# Patient Record
Sex: FEMALE
[De-identification: names, ages, dates, MRNs, and addresses within clinical notes are randomized per-mention and may not be internally consistent; named-entity substitution may affect disease eponyms.]

---

## 2018-05-18 ENCOUNTER — RX RENEWAL (OUTPATIENT)
Age: 68
End: 2018-05-18

## 2018-05-18 RX ORDER — EPINEPHRINE 0.3 MG/.3ML
0.3 INJECTION INTRAMUSCULAR
Qty: 1 | Refills: 0 | Status: ACTIVE | COMMUNITY
Start: 2018-05-18 | End: 1900-01-01

## 2018-11-13 ENCOUNTER — APPOINTMENT (OUTPATIENT)
Dept: INTERNAL MEDICINE | Facility: CLINIC | Age: 68
End: 2018-11-13
Payer: MEDICARE

## 2018-11-13 VITALS
TEMPERATURE: 98.1 F | WEIGHT: 171 LBS | HEART RATE: 82 BPM | HEIGHT: 63 IN | BODY MASS INDEX: 30.3 KG/M2 | OXYGEN SATURATION: 92 %

## 2018-11-13 VITALS — DIASTOLIC BLOOD PRESSURE: 90 MMHG | SYSTOLIC BLOOD PRESSURE: 150 MMHG

## 2018-11-13 DIAGNOSIS — E78.5 HYPERLIPIDEMIA, UNSPECIFIED: ICD-10-CM

## 2018-11-13 DIAGNOSIS — Z87.891 PERSONAL HISTORY OF NICOTINE DEPENDENCE: ICD-10-CM

## 2018-11-13 DIAGNOSIS — Z87.09 PERSONAL HISTORY OF OTHER DISEASES OF THE RESPIRATORY SYSTEM: ICD-10-CM

## 2018-11-13 DIAGNOSIS — Z87.898 PERSONAL HISTORY OF OTHER SPECIFIED CONDITIONS: ICD-10-CM

## 2018-11-13 DIAGNOSIS — R55 SYNCOPE AND COLLAPSE: ICD-10-CM

## 2018-11-13 DIAGNOSIS — M54.5 LOW BACK PAIN: ICD-10-CM

## 2018-11-13 PROCEDURE — 99215 OFFICE O/P EST HI 40 MIN: CPT | Mod: 25

## 2018-11-13 PROCEDURE — 36415 COLL VENOUS BLD VENIPUNCTURE: CPT

## 2018-11-13 PROCEDURE — 93000 ELECTROCARDIOGRAM COMPLETE: CPT

## 2018-11-13 RX ORDER — PREDNISONE 5 MG/1
5 TABLET ORAL
Qty: 59 | Refills: 0 | Status: DISCONTINUED | COMMUNITY
Start: 2018-05-18 | End: 2018-11-13

## 2018-11-13 NOTE — ASSESSMENT
[FreeTextEntry1] : recent episode of unexplained syncope, with no sequelae, no real bruising. no further sx.\par h/o hip replacement \par h/o syncope at a younger age.\par h/o recent cardiac eval and hyperlipidemia

## 2018-11-13 NOTE — HISTORY OF PRESENT ILLNESS
[FreeTextEntry8] :  early Sat am, got up to go to the BR, felt warm, felt dizzy and was going to put on , and gelt warm, and fainted at home, and  said she was disoriented, has sl pain in buttock. \par fine all day sat, and since then. no evidence of seizure or post ictal episode.\par no h/o heart disease\par had an episode of syncope as younger person after eating a large meal

## 2018-11-13 NOTE — PHYSICAL EXAM
[Well Nourished] : well nourished [Well Developed] : well developed [Normal Sclera/Conjunctiva] : normal sclera/conjunctiva [Normal Outer Ear/Nose] : the outer ears and nose were normal in appearance [No JVD] : no jugular venous distention [Supple] : supple [No Lymphadenopathy] : no lymphadenopathy [No Respiratory Distress] : no respiratory distress  [Clear to Auscultation] : lungs were clear to auscultation bilaterally [No Accessory Muscle Use] : no accessory muscle use [Normal Rate] : normal rate  [Regular Rhythm] : with a regular rhythm [Normal S1, S2] : normal S1 and S2 [No Carotid Bruits] : no carotid bruits [No Abdominal Bruit] : a ~M bruit was not heard ~T in the abdomen [No Varicosities] : no varicosities [No Edema] : there was no peripheral edema [No Extremity Clubbing/Cyanosis] : no extremity clubbing/cyanosis [Declined Breast Exam] : declined breast exam  [Soft] : abdomen soft [Non Tender] : non-tender [No CVA Tenderness] : no CVA  tenderness [No Rash] : no rash [Normal Gait] : normal gait [Coordination Grossly Intact] : coordination grossly intact [No Focal Deficits] : no focal deficits [Deep Tendon Reflexes (DTR)] : deep tendon reflexes were 2+ and symmetric [de-identified] : anxious in nad [FreeTextEntry1] : def [de-identified] : not done [de-identified] : well healed hip replacement scar on right, no ecchymoses noted

## 2018-11-13 NOTE — HEALTH RISK ASSESSMENT
[One fall no injury in past year] : Patient reported one fall in the past year without injury [0] : 2) Feeling down, depressed, or hopeless: Not at all (0) [] : No [de-identified] : Socially [de-identified] : Patient fainted a few days ago without injury [[id number]] : 0

## 2018-11-13 NOTE — PLAN
[FreeTextEntry1] : throat cult to lab, per patient's request\par labs, incl. crp and vit D level. \par ekg today is normal\par at this point, if all of above is normal , I do not think she needs further neuro or cardiology eval. if recurs, consider further w/u

## 2018-11-13 NOTE — REVIEW OF SYSTEMS
[Negative] : Heme/Lymph [FreeTextEntry4] : recently had a uri and treated with amoxicillin and now has a sore throat [FreeTextEntry5] : saw a cardiology and had a strep test [FreeTextEntry7] : had a colonoscopy [FreeTextEntry9] : joints hurt

## 2018-11-14 LAB
ALBUMIN SERPL ELPH-MCNC: 4.9 G/DL
ALP BLD-CCNC: 76 U/L
ALT SERPL-CCNC: 19 U/L
ANION GAP SERPL CALC-SCNC: 17 MMOL/L
APPEARANCE: CLEAR
AST SERPL-CCNC: 21 U/L
BASOPHILS # BLD AUTO: 0.03 K/UL
BASOPHILS NFR BLD AUTO: 0.5 %
BILIRUB SERPL-MCNC: 0.4 MG/DL
BILIRUBIN URINE: NEGATIVE
BLOOD URINE: NEGATIVE
BUN SERPL-MCNC: 17 MG/DL
CALCIUM SERPL-MCNC: 10 MG/DL
CHLORIDE SERPL-SCNC: 100 MMOL/L
CHOLEST SERPL-MCNC: 191 MG/DL
CHOLEST/HDLC SERPL: 3.4 RATIO
CO2 SERPL-SCNC: 24 MMOL/L
COLOR: YELLOW
CREAT SERPL-MCNC: 0.82 MG/DL
CRP SERPL-MCNC: 0.11 MG/DL
EOSINOPHIL # BLD AUTO: 0.25 K/UL
EOSINOPHIL NFR BLD AUTO: 3.9 %
GLUCOSE QUALITATIVE U: NEGATIVE MG/DL
GLUCOSE SERPL-MCNC: 102 MG/DL
HBA1C MFR BLD HPLC: 6 %
HCT VFR BLD CALC: 43.7 %
HDLC SERPL-MCNC: 56 MG/DL
HGB BLD-MCNC: 13.9 G/DL
IMM GRANULOCYTES NFR BLD AUTO: 0.2 %
KETONES URINE: NEGATIVE
LDLC SERPL CALC-MCNC: 86 MG/DL
LEUKOCYTE ESTERASE URINE: NEGATIVE
LYMPHOCYTES # BLD AUTO: 1.85 K/UL
LYMPHOCYTES NFR BLD AUTO: 28.6 %
MAN DIFF?: NORMAL
MCHC RBC-ENTMCNC: 29.3 PG
MCHC RBC-ENTMCNC: 31.8 GM/DL
MCV RBC AUTO: 92.2 FL
MONOCYTES # BLD AUTO: 0.45 K/UL
MONOCYTES NFR BLD AUTO: 7 %
NEUTROPHILS # BLD AUTO: 3.87 K/UL
NEUTROPHILS NFR BLD AUTO: 59.8 %
NITRITE URINE: NEGATIVE
PH URINE: 6.5
PLATELET # BLD AUTO: 274 K/UL
POTASSIUM SERPL-SCNC: 4.5 MMOL/L
PROT SERPL-MCNC: 7.2 G/DL
PROTEIN URINE: NEGATIVE MG/DL
RBC # BLD: 4.74 M/UL
RBC # FLD: 13.6 %
SODIUM SERPL-SCNC: 141 MMOL/L
SPECIFIC GRAVITY URINE: 1.01
TRIGL SERPL-MCNC: 244 MG/DL
TSH SERPL-ACNC: 3.87 UIU/ML
UROBILINOGEN URINE: NEGATIVE MG/DL
WBC # FLD AUTO: 6.46 K/UL

## 2018-11-15 LAB — 25(OH)D3 SERPL-MCNC: 31.9 NG/ML

## 2018-11-16 LAB — B-HEM STREP SPEC QL CULT: NORMAL

## 2020-09-25 ENCOUNTER — APPOINTMENT (OUTPATIENT)
Dept: INTERNAL MEDICINE | Facility: CLINIC | Age: 70
End: 2020-09-25

## 2020-12-16 PROBLEM — Z87.09 HISTORY OF SORE THROAT: Status: RESOLVED | Noted: 2018-11-13 | Resolved: 2020-12-16

## 2021-03-08 ENCOUNTER — APPOINTMENT (OUTPATIENT)
Dept: INTERNAL MEDICINE | Facility: CLINIC | Age: 71
End: 2021-03-08

## 2021-04-13 ENCOUNTER — APPOINTMENT (OUTPATIENT)
Dept: GASTROENTEROLOGY | Facility: AMBULATORY MEDICAL SERVICES | Age: 71
End: 2021-04-13
Payer: MEDICARE

## 2021-04-13 PROCEDURE — 45378 DIAGNOSTIC COLONOSCOPY: CPT

## 2024-03-19 ENCOUNTER — APPOINTMENT (OUTPATIENT)
Dept: ORTHOPEDIC SURGERY | Facility: CLINIC | Age: 74
End: 2024-03-19
Payer: MEDICARE

## 2024-03-19 VITALS — WEIGHT: 170 LBS | HEIGHT: 63 IN | BODY MASS INDEX: 30.12 KG/M2

## 2024-03-19 PROCEDURE — 99203 OFFICE O/P NEW LOW 30 MIN: CPT | Mod: 25

## 2024-03-19 PROCEDURE — J3490M: CUSTOM | Mod: NC

## 2024-03-19 PROCEDURE — 73130 X-RAY EXAM OF HAND: CPT | Mod: 50

## 2024-03-19 PROCEDURE — 20550 NJX 1 TENDON SHEATH/LIGAMENT: CPT | Mod: RT

## 2024-03-19 NOTE — HISTORY OF PRESENT ILLNESS
[de-identified] : R hand OA Stiffness and numbness MF is more painful and stiff   [7] : 7 [0] : 0 [Dull/Aching] : dull/aching [Ice] : ice [Tingling] : tingling [] : yes [FreeTextEntry1] : hands [FreeTextEntry5] : R hand is worse R MF locks has n/t wears braces at night [de-identified] : activity

## 2024-03-19 NOTE — ASSESSMENT
[FreeTextEntry1] : EMG  Return after EMG  R MF Trigger finger tendon sheath injection was performed because of pain inflammation and stiffness Anesthesia: ethyl chloride sprayed topically Celestone 6mg: An injection of Celestone 1cc Lidocaine: An injection of Lidocaine 1% 1cc Marcaine: An injection of Marcaine 0.5% 1cc  Patient has tried OTC's including aspirin, Ibuprofen, Aleve etc or prescription NSAIDS, and/or exercises at home and/ or physical therapy without satisfactory response. After verbal consent using sterile preparation and technique. The risks, benefits, and alternatives to cortisone injection were explained in full to the patient. Risks outlined include but are not limited to infection, sepsis, bleeding, scarring, skin discoloration, temporary increase in pain, syncopal episode, failure to resolve symptoms, allergic reaction, symptom recurrence, and elevation of blood sugar in diabetics. Patient understood the risks. All questions were answered. After discussion of options, patient requested an injection. Oral informed consent was obtained and sterile prep was done of the injection site. Sterile technique was utilized for the procedure including the preparation of the solutions used for the injection. Patient tolerated the procedure well. Advised to ice the injection site this evening. Prep with betadine locally to site. Sterile technique used

## 2024-03-27 ENCOUNTER — APPOINTMENT (OUTPATIENT)
Dept: NEUROLOGY | Facility: CLINIC | Age: 74
End: 2024-03-27
Payer: MEDICARE

## 2024-03-27 PROCEDURE — 95886 MUSC TEST DONE W/N TEST COMP: CPT

## 2024-03-27 PROCEDURE — 95912 NRV CNDJ TEST 11-12 STUDIES: CPT

## 2024-04-02 ENCOUNTER — APPOINTMENT (OUTPATIENT)
Dept: ORTHOPEDIC SURGERY | Facility: CLINIC | Age: 74
End: 2024-04-02
Payer: MEDICARE

## 2024-04-02 VITALS — HEIGHT: 63 IN | WEIGHT: 170 LBS | BODY MASS INDEX: 30.12 KG/M2

## 2024-04-02 DIAGNOSIS — M65.331 TRIGGER FINGER, RIGHT MIDDLE FINGER: ICD-10-CM

## 2024-04-02 DIAGNOSIS — G56.02 CARPAL TUNNEL SYNDROME, LEFT UPPER LIMB: ICD-10-CM

## 2024-04-02 DIAGNOSIS — G56.01 CARPAL TUNNEL SYNDROME, RIGHT UPPER LIMB: ICD-10-CM

## 2024-04-02 PROCEDURE — 99213 OFFICE O/P EST LOW 20 MIN: CPT

## 2024-04-02 NOTE — ASSESSMENT
[FreeTextEntry1] : I disucssed brace at night THerapy- ART I disucssed injection and surgery   Return prn

## 2024-04-02 NOTE — PHYSICAL EXAM
[de-identified] : R hand:  Tender volar wrist  Good finger ROM  +Tinels  +Phalens  +Compression test  Decreased sensation median nerve distribution   L hand:  Tender volar wrist  Good finger ROM  +Tinels  +Phalens  +Compression test  Decreased sensation median nerve distribution

## 2024-04-02 NOTE — HISTORY OF PRESENT ILLNESS
[4] : 4 [Dull/Aching] : dull/aching [de-identified] : R MF trigger injection helped last visit  EMG shows L mod CTS; R mild/mod CTS   [FreeTextEntry1] : R wrist,mf

## 2024-06-11 ENCOUNTER — APPOINTMENT (OUTPATIENT)
Dept: ORTHOPEDIC SURGERY | Facility: CLINIC | Age: 74
End: 2024-06-11

## 2024-06-11 ENCOUNTER — APPOINTMENT (OUTPATIENT)
Dept: ORTHOPEDIC SURGERY | Facility: CLINIC | Age: 74
End: 2024-06-11
Payer: MEDICARE

## 2024-06-11 VITALS — HEIGHT: 63 IN | BODY MASS INDEX: 30.12 KG/M2 | WEIGHT: 170 LBS

## 2024-06-11 DIAGNOSIS — M25.552 PAIN IN LEFT HIP: ICD-10-CM

## 2024-06-11 PROCEDURE — 99214 OFFICE O/P EST MOD 30 MIN: CPT

## 2024-06-11 PROCEDURE — 73503 X-RAY EXAM HIP UNI 4/> VIEWS: CPT | Mod: LT

## 2024-06-11 NOTE — DISCUSSION/SUMMARY
[de-identified] : The patient was advised of the diagnosis.  The natural history of the pathology was explained in full to the patient in layman's terms. All questions were answered.  The risks and benefits of surgical and non-surgical treatment alternatives were explained in full to the patient.  We had an extensive discussion regarding surgical intervention including risk, benefits and alternatives.  The risks include, but are not limited to infection, bleeding, injury to small nerves and blood vessels, pain, stiffness, phlebitis, DVT malunion, nonunion, and need for secondary procedures.  Preoperative, intraoperative and postoperative care were discussed and outlined to the patient as well.  The patient has had an opportunity to ask any question and all were answered to the best of my ability.  The natural progression of Osteoarthritis was explained to the patient.  We discussed the possible treatment options from conservative to operative.  These included NSAIDS, Glucosamine and Chondrotin sulfate, and Physical Therapy as well different types of injections.  We also discussed that at some point they may progress to needed a NORMA.  Information and pamphlets were given when approprate.  Entered by Stefania Barlow acting as a scribe.
No

## 2024-06-11 NOTE — ASSESSMENT
[FreeTextEntry1] : 6/11/24: Pt with adv LT hip OA. Discussed conservative vs surgical tx options- risks and benefits explained. Had extensive discussion regarding posterior vs anterior approach and the reasons why I choose to do posterior approach. Discused all surgical details and what postop recovery would entail. She is well informed and states she is interested in proceeding with L NORMA but she may want to discuss with some anterior approach surgeons first- she will call and let us know how she would like to proceed.

## 2024-06-11 NOTE — HISTORY OF PRESENT ILLNESS
[7] : 7 [Throbbing] : throbbing [Tightness] : tightness [Intermittent] : intermittent [Leisure] : leisure [Nothing helps with pain getting better] : Nothing helps with pain getting better [de-identified] : 6/11/24: 74F with LT hip pain x 2023. No specific injury/trauma. Hx of R NORMA in 2017 by Dr. Correa- doing well. Most pain laterally. Pain worse with walking more than a couple of blocks. Occ gets a stabbing pain with bending down. States she is able to bike with no pain. Frequently stretches. Not taking meds for pain. No formal tx so far. Ambulates without assistance.  [] : no [FreeTextEntry1] : left hip  [FreeTextEntry5] : Chronic left hip pain since 2023. No injury. Having lateral pain.  h/o of rt leidy 2017

## 2024-06-11 NOTE — IMAGING
[de-identified] : LEFT HIP +2 pt pulses flexion to 90 IR to 10 ER to 15 pain in groin with ER positive impingement 5/5 strength but pain with resisted SLR no troch tenderness no buttocks tenderness  RIGHT HIP good ROM with no pain  XR LT HIP showing adv LT hip OA, sig loss of joint space, sclerotic bone and sig bone spurring

## 2024-07-02 ENCOUNTER — APPOINTMENT (OUTPATIENT)
Dept: ORTHOPEDIC SURGERY | Facility: CLINIC | Age: 74
End: 2024-07-02
Payer: MEDICARE

## 2024-07-02 VITALS — WEIGHT: 170 LBS | HEIGHT: 63 IN | BODY MASS INDEX: 30.12 KG/M2

## 2024-07-02 DIAGNOSIS — M16.12 UNILATERAL PRIMARY OSTEOARTHRITIS, LEFT HIP: ICD-10-CM

## 2024-07-02 PROCEDURE — 99204 OFFICE O/P NEW MOD 45 MIN: CPT

## 2025-02-24 ENCOUNTER — NON-APPOINTMENT (OUTPATIENT)
Age: 75
End: 2025-02-24

## 2025-03-13 ENCOUNTER — APPOINTMENT (OUTPATIENT)
Dept: ORTHOPEDIC SURGERY | Facility: CLINIC | Age: 75
End: 2025-03-13
Payer: MEDICARE

## 2025-03-13 VITALS — WEIGHT: 170 LBS | HEIGHT: 63.5 IN | BODY MASS INDEX: 29.75 KG/M2

## 2025-03-13 DIAGNOSIS — M16.12 UNILATERAL PRIMARY OSTEOARTHRITIS, LEFT HIP: ICD-10-CM

## 2025-03-13 PROCEDURE — 99215 OFFICE O/P EST HI 40 MIN: CPT

## 2025-03-13 PROCEDURE — G2211 COMPLEX E/M VISIT ADD ON: CPT

## 2025-03-13 PROCEDURE — 73502 X-RAY EXAM HIP UNI 2-3 VIEWS: CPT

## 2025-03-19 ENCOUNTER — NON-APPOINTMENT (OUTPATIENT)
Age: 75
End: 2025-03-19

## 2025-03-21 ENCOUNTER — TRANSCRIPTION ENCOUNTER (OUTPATIENT)
Age: 75
End: 2025-03-21

## 2025-03-21 ENCOUNTER — NON-APPOINTMENT (OUTPATIENT)
Age: 75
End: 2025-03-21

## 2025-03-21 ENCOUNTER — APPOINTMENT (OUTPATIENT)
Dept: ORTHOPEDIC SURGERY | Facility: CLINIC | Age: 75
End: 2025-03-21
Payer: MEDICARE

## 2025-03-21 DIAGNOSIS — G56.02 CARPAL TUNNEL SYNDROME, LEFT UPPER LIMB: ICD-10-CM

## 2025-03-21 DIAGNOSIS — M65.341 TRIGGER FINGER, RIGHT RING FINGER: ICD-10-CM

## 2025-03-21 DIAGNOSIS — M19.042 PRIMARY OSTEOARTHRITIS, LEFT HAND: ICD-10-CM

## 2025-03-21 DIAGNOSIS — G56.01 CARPAL TUNNEL SYNDROME, RIGHT UPPER LIMB: ICD-10-CM

## 2025-03-21 DIAGNOSIS — D21.12 BENIGN NEOPLASM OF CONNECTIVE AND OTHER SOFT TISSUE OF LEFT UPPER LIMB, INCLUDING SHOULDER: ICD-10-CM

## 2025-03-21 DIAGNOSIS — M19.041 PRIMARY OSTEOARTHRITIS, RIGHT HAND: ICD-10-CM

## 2025-03-21 DIAGNOSIS — M67.442 GANGLION, LEFT HAND: ICD-10-CM

## 2025-03-21 PROCEDURE — 99214 OFFICE O/P EST MOD 30 MIN: CPT

## 2025-03-21 PROCEDURE — 73130 X-RAY EXAM OF HAND: CPT | Mod: 50

## 2025-03-21 PROCEDURE — 73110 X-RAY EXAM OF WRIST: CPT | Mod: 50

## 2025-03-22 ENCOUNTER — NON-APPOINTMENT (OUTPATIENT)
Age: 75
End: 2025-03-22

## 2025-03-24 NOTE — PRE PROCEDURE NOTE - GENERAL PROCEDURE NAME
endoscopic left carpal tunnel release , possible open excision of left finger mass deep, exciuson of left index finger ganglion cyst

## 2025-03-24 NOTE — PRE PROCEDURE NOTE - PRE PROCEDURE EVALUATION
Reason For Visit       Hand & Wrist - Reason for Visit: DMITRI HAJI is being seen for an initial visit for. bilateral hands.  ?  History of Present Illness       HPI:    DMITRI HAJI is a 74 year old right hand dominant female. She comes in today for evaluation of bilateral hand pain and numbness/tingling. She states that her left hand is worse compared to her right. She rates her pain as an 8/10 and has a burning sensation in the morning. She also reports a right ring finger triggering.  ?  She saw Dr. Ezio Cote approximately 1 year ago and was given a cortisone injection for a right middle finger trigger finger.  ?  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 This is consistent with the clinical diagnosis of moderate left and moderate right carpal tunnel syndrome.  ?  She states that she is scheduled for left hip replacement on 5/12/2025 by Dr. Man.  ?  She is accompanied by her .  ?  Active Problems   1. 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 Carpal tunnel syndrome of left wrist (354.0) (G56.02)   3. Carpal tunnel syndrome of right wrist (354.0) (G56.01)   4. Ganglion cyst of finger of left hand (727.43) (M67.442)   5. History of angioedema (V13.89) (Z87.898)   6. Hyperlipidemia (272.4) (E78.5)   7. Lateral pain of left hip (719.45) (M25.552)   8. Lumbago (724.2) (M54.50)   9. Primary localized osteoarthrosis of pelvic region, right (715.15) (M16.11)   10. Primary osteoarthritis of left hip (715.15) (M16.12)   11. Syncope and collapse (780.2) (R55)   12. Trigger middle finger of right hand (727.03) (M65.331)           ?  Past Medical History   1. History of High cholesterol (272.0) (E78.00)   2. History of angioedema (V13.89) (Z87.898)   3. History of sore throat (V12.69) (Z87.09)   4. Personal history of arthritis (V13.4) (Z87.39)           Current Meds   1. Co Q 10 CAPS;   Therapy: (Recorded:40Wux6832) to Recorded   2. EpiPen 2-Aly 0.3 MG/0.3ML Injection Solution Auto-injector (EPINEPHrine); INJECT   0.3ML INTRAMUSCULARLY AS DIRECTED PRN;   Therapy: [id number] to (Evaluate:17Jun2018)  Requested for: 99Uml5955; Last   Rx:65Spq1206 Ordered   3. Fish Oil CAPS;   Therapy: (Recorded:85Etd7760) to Recorded   4. Vitamin D TABS;   Therapy: (Recorded:25Xpr6785) to Recorded           ?  Allergies   1. No Known Drug Allergies   2. Seasonal Allergies           ?  Review of Systems       Complete ROS:    Hand Dominance: right.    Neurological:. see HPI.    Constitutional, Eyes, ENT, Cardiovascular, Respiratory, Gastrointestinal, Genitourinary, Integumentary, Psychiatric, Endocrine, Heme/Lymph and Allergic/Immunologic review of systems are otherwise negative except as noted in HPI.  ?  Physical Exam       - Constitutional: This is a female in no obvious distress. She is accompanied by her .  - Psych: Patient is alert and oriented to person, place and time. Patient has a normal mood and affect.  - Cardiovascular: Normal pulses throughout the upper extremities. No significant varicosities are noted in the upper extremities.  - Neuro: Strength and sensation are intact throughout the upper extremities. Patient has normal coordination.  - Respiratory: Patient exhibits no evidence of shortness of breath or difficulty breathing.  - Skin: No rashes, lesions, or other abnormalities are noted in the upper extremities.  ---    Imaging: PA, lateral, and oblique radiographs of the bilateral wrists and hands demonstrate advanced bilateral MCP joint arthritis of the thumbs. Mild to moderate arthritis at the PIP and DIP joints of the digit. Moderate left and mild right thumb CMC joint arthritis.     ?  Pre-Surgical Assessment          Recommended PST type based on risk: PST Chart Review (low patient risk, very low procedural risk)       Surgery or procedure potential timeframe: 1 week    Anesthesia History: Previous Reactions to Anesthesia: none    STOP BANG Assessment: over 50 years of age, but does not snore loudly, not tired, fatigued, or sleepy during the daytime, not observed to have stopped during sleep, no hypertension, not treated for high blood pressure, BMI less than 35 kG/m2, neck circumference less than 16 inches and not male (birth sex)    STOP BANG Score: 0 - 2: Low Risk    Transfusion History - Blood Acceptance/Avoidance/Restrictions: Not willing to accept blood; Reason:    Previous blood transfusion: no    External/internal devices being used in treatment of patient: No devices in use in treatment    Device details: THR RIGHT    Reproductive, Female - Is patient pregnant? No, patient is not pregnant    Advance Directive/Health Care Proxy: No advance directive in chart and No Health Care Proxy information in chart  ?  Discussion/Summary       Narrative: She has findings consistent with ___  ?  I had a discussion with the patient regarding today's visit, the prognosis of this diagnosis, and treatment recommendations and options. At this time, with regard to her left carpal tunnel syndrome, I discussed treatment options of a cortisone injection or surgical management. She opted to proceed with surgical management and will speak to my surgical scheduler in regard to booking it.  ?  - The nature and purposes of endoscopic carpal tunnel release was discussed in detail with the patient. We discussed the surgical procedure in detail, as well as expected postoperative recovery and outcome.  - Possible risks, benefits and complications (from known and unknown causes) of the procedure were discussed in detail.  - Possible non-operative alternatives to the proposed treatment were discussed in detail.  - I discussed with the patient that I will be performing an endoscopic carpal tunnel release. We discussed both open and endoscopic carpal tunnel release, and the associated risks and benefits of each of these procedures. The patient understands that it is possible that the endoscopic carpal tunnel release may need to be converted to an open release during the procedure, if the carpal tunnel cannot be well visualized or safely released endoscopically. In addition, the patient does understand that, based upon some of the hand surgery literature, there is a slightly higher risk of iatrogenic nerve or vessel injury associated with endoscopic carpal tunnel release, as compared to open carpal tunnel release.  - She was also told that other possible risks/complications include, but are not limited to: Infection, nerve or vessel injury, stiffness, painful scar, poor outcome, need for additional surgical procedures, and other unforeseen complications.  - In addition, the possibility of an "unsuccessful outcome," despite "successful surgery," was discussed with the patient. The patient understands that nerve recovery after surgical release can be unpredictable, and there are no "guarantees" that the preoperative symptoms will completely resolve.  - The patient fully understands these risks and wishes to proceed.  - I had a lengthy discussion with the patient regarding today's visit, the diagnosis, and my surgical treatment recommendations. The patient has agreed to this plan of management and has expressed full understanding. All questions were fully answered to the patient's satisfaction.  ?  My cumulative time spent on this visit included: Preparation for the visit, review of the medical records, review of pertinent diagnostic studies, examination and counseling of the patient on the above diagnosis, treatment plan and prognosis, orders of diagnostic tests, medication and/or appropriate procedures and documentation in the medical records of today's visit.     ?  Assessment   1. Ganglion cyst of finger of left hand (727.43) (M67.442)   2. 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 Surgery Booking Form Inpatient  .  Status: Need Information - Required information   Requested for: 21Mar2025  Did the patient receive COVID 19 vaccine? : Yes, Patient stated  ERP (Enhanced Recovery Program) : No  Medical Evaluation : No  PST Triage Evaluation : PST Review  Admission Type : OP  Anesthesia Alert : N  Anesthesia Type : IVAS  Laterality : Left  Length of Procedure : 1 hour  Procedure Description: : 1. Endoscopic left carpal tunnel lease, possible open      2. Excision of left index finger mass, deep (51314)      3. Excision of left index finger ganglion cyst (61348)   2. Xray Hand 3 Views, Bilateral; Status:Complete - Retrospective By Protocol Authorization;    Done: 21Mar2025   3. Xray Wrist 3 Views, Bilateral; Status:Complete - Retrospective By Protocol Authorization;    Done: 21Mar2025           ?  End of Visit       Attestation:     This note was written by Josh Baron on 03/21/2025 acting solely as a scribe for Dr. Duane Barraza.  ?  All medical record entries made by the Scribe were at my, Dr. Duane Barraza, direction and personally dictated by me on 03/21/2025. I have personally reviewed the chart and agree that the record accurately reflects my personal performance of the history, physical exam, assessment and plan.  Attesting Faculty: See Attending Electronic Signature Below  ?  Addendum       Narrative: I, Josh Baron, acted solely as a scribe for Dr. Barraza on this date on 03/21/2025.  ?  Signatures       Electronically signed by : PHYLICIA MONTOYA NP; Mar 21 2025 12:15PM Eastern Standard Time (Author)    ?

## 2025-03-27 ENCOUNTER — TRANSCRIPTION ENCOUNTER (OUTPATIENT)
Age: 75
End: 2025-03-27

## 2025-03-27 ENCOUNTER — RESULT REVIEW (OUTPATIENT)
Age: 75
End: 2025-03-27

## 2025-03-27 ENCOUNTER — OUTPATIENT (OUTPATIENT)
Dept: OUTPATIENT SERVICES | Facility: HOSPITAL | Age: 75
LOS: 1 days | End: 2025-03-27
Payer: MEDICARE

## 2025-03-27 ENCOUNTER — APPOINTMENT (OUTPATIENT)
Dept: ORTHOPEDIC SURGERY | Facility: HOSPITAL | Age: 75
End: 2025-03-27

## 2025-03-27 VITALS
SYSTOLIC BLOOD PRESSURE: 115 MMHG | DIASTOLIC BLOOD PRESSURE: 62 MMHG | HEART RATE: 66 BPM | OXYGEN SATURATION: 100 % | RESPIRATION RATE: 14 BRPM

## 2025-03-27 VITALS
OXYGEN SATURATION: 100 % | HEART RATE: 65 BPM | TEMPERATURE: 98 F | WEIGHT: 168.43 LBS | RESPIRATION RATE: 16 BRPM | HEIGHT: 64 IN | SYSTOLIC BLOOD PRESSURE: 162 MMHG | DIASTOLIC BLOOD PRESSURE: 83 MMHG

## 2025-03-27 DIAGNOSIS — D21.12 BENIGN NEOPLASM OF CONNECTIVE AND OTHER SOFT TISSUE OF LEFT UPPER LIMB, INCLUDING SHOULDER: ICD-10-CM

## 2025-03-27 DIAGNOSIS — G56.02 CARPAL TUNNEL SYNDROME, LEFT UPPER LIMB: ICD-10-CM

## 2025-03-27 DIAGNOSIS — M67.442 GANGLION, LEFT HAND: ICD-10-CM

## 2025-03-27 PROCEDURE — 88307 TISSUE EXAM BY PATHOLOGIST: CPT | Mod: 26

## 2025-03-27 PROCEDURE — 88305 TISSUE EXAM BY PATHOLOGIST: CPT | Mod: 26

## 2025-03-27 PROCEDURE — 26116 EXC HAND TUM DEEP < 1.5 CM: CPT | Mod: F1

## 2025-03-27 PROCEDURE — 88307 TISSUE EXAM BY PATHOLOGIST: CPT

## 2025-03-27 PROCEDURE — 29848 WRIST ENDOSCOPY/SURGERY: CPT | Mod: LT

## 2025-03-27 PROCEDURE — 88305 TISSUE EXAM BY PATHOLOGIST: CPT

## 2025-03-27 PROCEDURE — 26160 REMOVE TENDON SHEATH LESION: CPT | Mod: F2

## 2025-03-27 PROCEDURE — 26160 REMOVE TENDON SHEATH LESION: CPT | Mod: 59,F1

## 2025-03-27 PROCEDURE — 26116 EXC HAND TUM DEEP < 1.5 CM: CPT | Mod: XU,F2

## 2025-03-27 RX ORDER — ACETAMINOPHEN 500 MG/5ML
1000 LIQUID (ML) ORAL ONCE
Refills: 0 | Status: COMPLETED | OUTPATIENT
Start: 2025-03-27 | End: 2025-03-27

## 2025-03-27 RX ORDER — IBUPROFEN 200 MG
1 TABLET ORAL
Qty: 15 | Refills: 0
Start: 2025-03-27

## 2025-03-27 RX ORDER — HYDROMORPHONE/SOD CHLOR,ISO/PF 2 MG/10 ML
1 SYRINGE (ML) INJECTION
Refills: 0 | Status: DISCONTINUED | OUTPATIENT
Start: 2025-03-27 | End: 2025-03-27

## 2025-03-27 RX ORDER — HYDROMORPHONE/SOD CHLOR,ISO/PF 2 MG/10 ML
0.5 SYRINGE (ML) INJECTION
Refills: 0 | Status: DISCONTINUED | OUTPATIENT
Start: 2025-03-27 | End: 2025-03-27

## 2025-03-27 RX ORDER — SODIUM CHLORIDE 9 G/1000ML
1000 INJECTION, SOLUTION INTRAVENOUS
Refills: 0 | Status: DISCONTINUED | OUTPATIENT
Start: 2025-03-27 | End: 2025-03-27

## 2025-03-27 RX ORDER — OXYCODONE HYDROCHLORIDE 30 MG/1
5 TABLET ORAL ONCE
Refills: 0 | Status: DISCONTINUED | OUTPATIENT
Start: 2025-03-27 | End: 2025-03-27

## 2025-03-27 RX ORDER — ONDANSETRON HCL/PF 4 MG/2 ML
4 VIAL (ML) INJECTION ONCE
Refills: 0 | Status: DISCONTINUED | OUTPATIENT
Start: 2025-03-27 | End: 2025-03-27

## 2025-03-27 RX ORDER — APREPITANT 40 MG/1
40 CAPSULE ORAL ONCE
Refills: 0 | Status: COMPLETED | OUTPATIENT
Start: 2025-03-27 | End: 2025-03-27

## 2025-03-27 RX ORDER — OXYCODONE HYDROCHLORIDE AND ACETAMINOPHEN 10; 325 MG/1; MG/1
1 TABLET ORAL
Qty: 5 | Refills: 0
Start: 2025-03-27

## 2025-03-27 RX ORDER — CEFAZOLIN SODIUM IN 0.9 % NACL 3 G/100 ML
2000 INTRAVENOUS SOLUTION, PIGGYBACK (ML) INTRAVENOUS ONCE
Refills: 0 | Status: COMPLETED | OUTPATIENT
Start: 2025-03-27 | End: 2025-03-27

## 2025-03-27 RX ADMIN — Medication 1 APPLICATION(S): at 07:14

## 2025-03-27 RX ADMIN — APREPITANT 40 MILLIGRAM(S): 40 CAPSULE ORAL at 07:14

## 2025-03-27 NOTE — BRIEF OPERATIVE NOTE - NSICDXBRIEFPREOP_GEN_ALL_CORE_FT
PRE-OP DIAGNOSIS:  Left carpal tunnel syndrome 27-Mar-2025 10:02:37  Marge Hinojosa  Lesion of finger 27-Mar-2025 10:03:13  Marge Hinojosa

## 2025-03-27 NOTE — ASU PATIENT PROFILE, ADULT - FALL HARM RISK - UNIVERSAL INTERVENTIONS
Bed in lowest position, wheels locked, appropriate side rails in place/Call bell, personal items and telephone in reach/Instruct patient to call for assistance before getting out of bed or chair/Non-slip footwear when patient is out of bed/[place] to call system/Physically safe environment - no spills, clutter or unnecessary equipment/Purposeful Proactive Rounding/Room/bathroom lighting operational, light cord in reach

## 2025-03-27 NOTE — ASU DISCHARGE PLAN (ADULT/PEDIATRIC) - NS MD DC FALL RISK RISK
For information on Fall & Injury Prevention, visit: [email or web address] OR  [email or web address] OR  https://www.cdc.gov/steadi/patient.html

## 2025-03-27 NOTE — ASU DISCHARGE PLAN (ADULT/PEDIATRIC) - FINANCIAL ASSISTANCE
[facility] provides services at a reduced cost to those who are determined to be eligible through [facility]’s financial assistance program. Information regarding [facility]’s financial assistance program can be found by going to [email or web address] or by calling [phone].

## 2025-03-27 NOTE — ASU DISCHARGE PLAN (ADULT/PEDIATRIC) - CARE PROVIDER_API CALL
Duane Barraza)  Surgery of the Hand  [address], [address]  [place] [address]  Phone: [phone]  Fax: [phone]  Scheduled Appointment: 04/04/2025

## 2025-03-27 NOTE — BRIEF OPERATIVE NOTE - NSICDXBRIEFPOSTOP_GEN_ALL_CORE_FT
POST-OP DIAGNOSIS:  Left carpal tunnel syndrome 27-Mar-2025 10:03:27  Marge Hinojosa  Finger lesion 27-Mar-2025 10:03:34  Marge Hinojosa

## 2025-03-27 NOTE — BRIEF OPERATIVE NOTE - NSICDXBRIEFPROCEDURE_GEN_ALL_CORE_FT
PROCEDURES:  Endoscopic carpal tunnel release of left wrist 27-Mar-2025 10:00:59  Marge Hinojosa  Excision of lesion of left index finger 27-Mar-2025 10:02:23  Marge Hinojosa

## 2025-03-28 ENCOUNTER — NON-APPOINTMENT (OUTPATIENT)
Age: 75
End: 2025-03-28

## 2025-04-04 ENCOUNTER — APPOINTMENT (OUTPATIENT)
Dept: ORTHOPEDIC SURGERY | Facility: CLINIC | Age: 75
End: 2025-04-04
Payer: MEDICARE

## 2025-04-04 DIAGNOSIS — G56.02 CARPAL TUNNEL SYNDROME, LEFT UPPER LIMB: ICD-10-CM

## 2025-04-04 PROCEDURE — 99024 POSTOP FOLLOW-UP VISIT: CPT

## 2025-04-07 ENCOUNTER — APPOINTMENT (OUTPATIENT)
Dept: ORTHOPEDIC SURGERY | Facility: CLINIC | Age: 75
End: 2025-04-07
Payer: MEDICARE

## 2025-04-07 ENCOUNTER — NON-APPOINTMENT (OUTPATIENT)
Age: 75
End: 2025-04-07

## 2025-04-07 PROCEDURE — 99024 POSTOP FOLLOW-UP VISIT: CPT

## 2025-04-23 ENCOUNTER — APPOINTMENT (OUTPATIENT)
Dept: ORTHOPEDIC SURGERY | Facility: CLINIC | Age: 75
End: 2025-04-23
Payer: MEDICARE

## 2025-04-23 DIAGNOSIS — D21.12 BENIGN NEOPLASM OF CONNECTIVE AND OTHER SOFT TISSUE OF LEFT UPPER LIMB, INCLUDING SHOULDER: ICD-10-CM

## 2025-04-23 DIAGNOSIS — G56.02 CARPAL TUNNEL SYNDROME, LEFT UPPER LIMB: ICD-10-CM

## 2025-04-23 DIAGNOSIS — M67.442 GANGLION, LEFT HAND: ICD-10-CM

## 2025-04-23 PROCEDURE — 99024 POSTOP FOLLOW-UP VISIT: CPT

## 2025-04-25 ENCOUNTER — OUTPATIENT (OUTPATIENT)
Dept: OUTPATIENT SERVICES | Facility: HOSPITAL | Age: 75
LOS: 1 days | End: 2025-04-25
Payer: MEDICARE

## 2025-04-25 VITALS
OXYGEN SATURATION: 98 % | TEMPERATURE: 98 F | WEIGHT: 171.08 LBS | DIASTOLIC BLOOD PRESSURE: 82 MMHG | SYSTOLIC BLOOD PRESSURE: 146 MMHG | HEART RATE: 76 BPM | HEIGHT: 63.5 IN | RESPIRATION RATE: 18 BRPM

## 2025-04-25 DIAGNOSIS — Z98.890 OTHER SPECIFIED POSTPROCEDURAL STATES: Chronic | ICD-10-CM

## 2025-04-25 DIAGNOSIS — M16.12 UNILATERAL PRIMARY OSTEOARTHRITIS, LEFT HIP: ICD-10-CM

## 2025-04-25 DIAGNOSIS — Z01.818 ENCOUNTER FOR OTHER PREPROCEDURAL EXAMINATION: ICD-10-CM

## 2025-04-25 DIAGNOSIS — Z96.641 PRESENCE OF RIGHT ARTIFICIAL HIP JOINT: Chronic | ICD-10-CM

## 2025-04-25 LAB
A1C WITH ESTIMATED AVERAGE GLUCOSE RESULT: 6.4 % — HIGH (ref 4–5.6)
ANION GAP SERPL CALC-SCNC: 12 MMOL/L — SIGNIFICANT CHANGE UP (ref 5–17)
BUN SERPL-MCNC: 22 MG/DL — SIGNIFICANT CHANGE UP (ref 7–23)
CALCIUM SERPL-MCNC: 9.6 MG/DL — SIGNIFICANT CHANGE UP (ref 8.4–10.5)
CHLORIDE SERPL-SCNC: 102 MMOL/L — SIGNIFICANT CHANGE UP (ref 96–108)
CO2 SERPL-SCNC: 25 MMOL/L — SIGNIFICANT CHANGE UP (ref 22–31)
CREAT SERPL-MCNC: 0.73 MG/DL — SIGNIFICANT CHANGE UP (ref 0.5–1.3)
EGFR: 87 ML/MIN/1.73M2 — SIGNIFICANT CHANGE UP
EGFR: 87 ML/MIN/1.73M2 — SIGNIFICANT CHANGE UP
ESTIMATED AVERAGE GLUCOSE: 137 MG/DL — HIGH (ref 68–114)
GLUCOSE SERPL-MCNC: 86 MG/DL — SIGNIFICANT CHANGE UP (ref 70–99)
HCT VFR BLD CALC: 42.1 % — SIGNIFICANT CHANGE UP (ref 34.5–45)
HGB BLD-MCNC: 13.9 G/DL — SIGNIFICANT CHANGE UP (ref 11.5–15.5)
MCHC RBC-ENTMCNC: 29.3 PG — SIGNIFICANT CHANGE UP (ref 27–34)
MCHC RBC-ENTMCNC: 33 G/DL — SIGNIFICANT CHANGE UP (ref 32–36)
MCV RBC AUTO: 88.8 FL — SIGNIFICANT CHANGE UP (ref 80–100)
MRSA PCR RESULT.: SIGNIFICANT CHANGE UP
NRBC BLD AUTO-RTO: 0 /100 WBCS — SIGNIFICANT CHANGE UP (ref 0–0)
PLATELET # BLD AUTO: 264 K/UL — SIGNIFICANT CHANGE UP (ref 150–400)
POTASSIUM SERPL-MCNC: 4 MMOL/L — SIGNIFICANT CHANGE UP (ref 3.5–5.3)
POTASSIUM SERPL-SCNC: 4 MMOL/L — SIGNIFICANT CHANGE UP (ref 3.5–5.3)
RBC # BLD: 4.74 M/UL — SIGNIFICANT CHANGE UP (ref 3.8–5.2)
RBC # FLD: 12.8 % — SIGNIFICANT CHANGE UP (ref 10.3–14.5)
S AUREUS DNA NOSE QL NAA+PROBE: DETECTED
SODIUM SERPL-SCNC: 139 MMOL/L — SIGNIFICANT CHANGE UP (ref 135–145)
WBC # BLD: 6.87 K/UL — SIGNIFICANT CHANGE UP (ref 3.8–10.5)
WBC # FLD AUTO: 6.87 K/UL — SIGNIFICANT CHANGE UP (ref 3.8–10.5)

## 2025-04-25 PROCEDURE — 80048 BASIC METABOLIC PNL TOTAL CA: CPT

## 2025-04-25 PROCEDURE — G0463: CPT

## 2025-04-25 PROCEDURE — 87640 STAPH A DNA AMP PROBE: CPT

## 2025-04-25 PROCEDURE — 85027 COMPLETE CBC AUTOMATED: CPT

## 2025-04-25 PROCEDURE — 83036 HEMOGLOBIN GLYCOSYLATED A1C: CPT

## 2025-04-25 PROCEDURE — 87641 MR-STAPH DNA AMP PROBE: CPT

## 2025-04-25 RX ORDER — LIDOCAINE HCL/PF 10 MG/ML
0.2 VIAL (ML) INJECTION ONCE
Refills: 0 | Status: DISCONTINUED | OUTPATIENT
Start: 2025-05-12 | End: 2025-05-26

## 2025-04-25 RX ORDER — SODIUM CHLORIDE 9 G/1000ML
1000 INJECTION, SOLUTION INTRAVENOUS
Refills: 0 | Status: DISCONTINUED | OUTPATIENT
Start: 2025-05-12 | End: 2025-05-26

## 2025-04-25 NOTE — H&P PST ADULT - HISTORY OF PRESENT ILLNESS
74 year old female with PMHx of HLD presents to [facility] for left total hip arthroplasty on 5/12/25. Patient denies fever, chills, SOB, chest pain.

## 2025-04-25 NOTE — H&P PST ADULT - NSANTHOSAYNRD_GEN_A_CORE
No. LOPEZ screening performed.  STOP BANG Legend: 0-2 = LOW Risk; 3-4 = INTERMEDIATE Risk; 5-8 = HIGH Risk

## 2025-04-25 NOTE — H&P PST ADULT - ASSESSMENT
Denies loose/cracked teeth, dentures/bridges  Mallampati III    CAPRINI SCORE    AGE RELATED RISK FACTORS                                                             [ ] Age 41-60 years                                            (1 Point)  [x] Age: 61-74 years                                           (2 Points)                 [ ] Age= 75 years                                                (3 Points)             DISEASE RELATED RISK FACTORS                                                       [ ] Edema in the lower extremities                 (1 Point)                     [ ] Varicose veins                                               (1 Point)                                 [x] BMI > 25 Kg/m2                                            (1 Point)                                  [ ] Serious infection (ie PNA)                            (1 Point)                     [ ] Lung disease ( COPD, Emphysema)            (1 Point)                                                                          [ ] Acute myocardial infarction                         (1 Point)                  [ ] Congestive heart failure (in the previous month)  (1 Point)         [ ] Inflammatory bowel disease                            (1 Point)                  [ ] Central venous access, PICC or Port               (2 points)       (within the last month)                                                                [ ] Stroke (in the previous month)                        (5 Points)    [ ] Previous or present malignancy                       (2 points)                                                                                                                                                         HEMATOLOGY RELATED FACTORS                                                         [ ] Prior episodes of VTE                                     (3 Points)                     [ ] Positive family history for VTE                      (3 Points)                  [ ] Prothrombin 59902 A                                     (3 Points)                     [ ] Factor V Leiden                                                (3 Points)                        [ ] Lupus anticoagulants                                      (3 Points)                                                           [ ] Anticardiolipin antibodies                              (3 Points)                                                       [ ] High homocysteine in the blood                   (3 Points)                                             [ ] Other congenital or acquired thrombophilia      (3 Points)                                                [ ] Heparin induced thrombocytopenia                  (3 Points)                                        MOBILITY RELATED FACTORS  [ ] Bed rest                                                         (1 Point)  [ ] Plaster cast                                                    (2 points)  [ ] Bed bound for more than 72 hours           (2 Points)    GENDER SPECIFIC FACTORS  [ ] Pregnancy or had a baby within the last month   (1 Point)  [ ] Post-partum < 6 weeks                                   (1 Point)  [ ] Hormonal therapy  or oral contraception   (1 Point)  [ ] History of pregnancy complications              (1 point)  [ ] Unexplained or recurrent              (1 Point)    OTHER RISK FACTORS                                           (1 Point)  [ ] BMI >40, smoking, diabetes requiring insulin, chemotherapy  blood transfusions and length of surgery over 2 hours    SURGERY RELATED RISK FACTORS  [ ]  Section within the last month     (1 Point)  [ ] Minor surgery                                                  (1 Point)  [ ] Arthroscopic surgery                                       (2 Points)  [ ] Planned major surgery lasting more            (2 Points)      than 45 minutes     [x] Elective hip or knee joint replacement       (5 points)       surgery                                                TRAUMA RELATED RISK FACTORS  [ ] Fracture of the hip, pelvis, or leg                       (5 Points)  [ ] Spinal cord injury resulting in paralysis             (5 points)       (in the previous month)    [ ] Paralysis  (less than 1 month)                             (5 Points)  [ ] Multiple Trauma within 1 month                        (5 Points)    Total Score [   8     ]    Caprini Score 0-2: Low Risk, NO VTE prophylaxis required for most patients, encourage ambulation  Caprini Score 3-6: Moderate Risk , pharmacologic VTE prophylaxis is indicated for most patients (in the absence of contraindications)  Caprini Score Greater than or =7: High risk, pharmocologic VTE prophylaxis indicated for most patients (in the absence of contraindications)

## 2025-04-25 NOTE — H&P PST ADULT - PROBLEM SELECTOR PLAN 1
Planned for left total hip arthroplasty on 5/12/25. Surgical, ERP and Chlorhexidine instructions reviewed and provided to patient. CBC, BMP, HgbA1c and MRSA obtained at PST.

## 2025-04-25 NOTE — H&P PST ADULT - NSICDXPASTSURGICALHX_GEN_ALL_CORE_FT
PAST SURGICAL HISTORY:  H/O carpal tunnel repair     H/O excision of ganglion cyst     S/P hip replacement, right

## 2025-04-25 NOTE — H&P PST ADULT - NSHP PST SURGERY DATE_DT_GEN_A_CORE
Called dona hester @ [phone] spoke to ligia & aimee sd that its too early to set up the next shipment. Sd to call back 03/04/24   12-May-2025

## 2025-04-28 ENCOUNTER — NON-APPOINTMENT (OUTPATIENT)
Age: 75
End: 2025-04-28

## 2025-05-01 ENCOUNTER — NON-APPOINTMENT (OUTPATIENT)
Age: 75
End: 2025-05-01

## 2025-05-01 RX ORDER — MUPIROCIN 20 MG/G
2 OINTMENT TOPICAL
Qty: 1 | Refills: 0 | Status: ACTIVE | COMMUNITY
Start: 2025-05-01 | End: 1900-01-01

## 2025-05-07 DIAGNOSIS — Z96.642 PRESENCE OF LEFT ARTIFICIAL HIP JOINT: ICD-10-CM

## 2025-05-07 RX ORDER — ASPIRIN 81 MG/1
81 TABLET, DELAYED RELEASE ORAL
Qty: 60 | Refills: 0 | Status: ACTIVE | COMMUNITY
Start: 2025-05-07 | End: 1900-01-01

## 2025-05-07 RX ORDER — OXYCODONE 5 MG/1
5 TABLET ORAL
Qty: 28 | Refills: 0 | Status: ACTIVE | COMMUNITY
Start: 2025-05-07 | End: 1900-01-01

## 2025-05-07 RX ORDER — TRAMADOL HYDROCHLORIDE 50 MG/1
50 TABLET, COATED ORAL
Qty: 28 | Refills: 0 | Status: ACTIVE | COMMUNITY
Start: 2025-05-07 | End: 1900-01-01

## 2025-05-07 RX ORDER — PANTOPRAZOLE 40 MG/1
40 TABLET, DELAYED RELEASE ORAL DAILY
Qty: 30 | Refills: 1 | Status: ACTIVE | COMMUNITY
Start: 2025-05-07 | End: 1900-01-01

## 2025-05-07 RX ORDER — NAPROXEN 500 MG/1
500 TABLET ORAL
Qty: 60 | Refills: 2 | Status: ACTIVE | COMMUNITY
Start: 2025-05-07 | End: 1900-01-01

## 2025-05-08 PROBLEM — M16.12 UNILATERAL PRIMARY OSTEOARTHRITIS, LEFT HIP: Chronic | Status: ACTIVE | Noted: 2025-04-25

## 2025-05-08 PROBLEM — E78.5 HYPERLIPIDEMIA, UNSPECIFIED: Chronic | Status: ACTIVE | Noted: 2025-04-25

## 2025-05-12 ENCOUNTER — TRANSCRIPTION ENCOUNTER (OUTPATIENT)
Age: 75
End: 2025-05-12

## 2025-05-12 ENCOUNTER — OUTPATIENT (OUTPATIENT)
Dept: OUTPATIENT SERVICES | Facility: HOSPITAL | Age: 75
LOS: 1 days | End: 2025-05-12
Payer: MEDICARE

## 2025-05-12 ENCOUNTER — RESULT REVIEW (OUTPATIENT)
Age: 75
End: 2025-05-12

## 2025-05-12 ENCOUNTER — APPOINTMENT (OUTPATIENT)
Dept: ORTHOPEDIC SURGERY | Facility: HOSPITAL | Age: 75
End: 2025-05-12

## 2025-05-12 VITALS
HEART RATE: 95 BPM | DIASTOLIC BLOOD PRESSURE: 60 MMHG | OXYGEN SATURATION: 95 % | TEMPERATURE: 97 F | SYSTOLIC BLOOD PRESSURE: 112 MMHG | RESPIRATION RATE: 16 BRPM

## 2025-05-12 VITALS
DIASTOLIC BLOOD PRESSURE: 80 MMHG | HEIGHT: 63.5 IN | OXYGEN SATURATION: 98 % | TEMPERATURE: 98 F | WEIGHT: 171.08 LBS | SYSTOLIC BLOOD PRESSURE: 157 MMHG | HEART RATE: 72 BPM | RESPIRATION RATE: 16 BRPM

## 2025-05-12 DIAGNOSIS — M16.12 UNILATERAL PRIMARY OSTEOARTHRITIS, LEFT HIP: ICD-10-CM

## 2025-05-12 DIAGNOSIS — Z98.890 OTHER SPECIFIED POSTPROCEDURAL STATES: Chronic | ICD-10-CM

## 2025-05-12 DIAGNOSIS — Z96.641 PRESENCE OF RIGHT ARTIFICIAL HIP JOINT: Chronic | ICD-10-CM

## 2025-05-12 PROCEDURE — 27130 TOTAL HIP ARTHROPLASTY: CPT | Mod: 79,LT

## 2025-05-12 PROCEDURE — 72170 X-RAY EXAM OF PELVIS: CPT

## 2025-05-12 PROCEDURE — 97161 PT EVAL LOW COMPLEX 20 MIN: CPT

## 2025-05-12 PROCEDURE — 27130 TOTAL HIP ARTHROPLASTY: CPT | Mod: LT

## 2025-05-12 PROCEDURE — 82962 GLUCOSE BLOOD TEST: CPT

## 2025-05-12 PROCEDURE — 97165 OT EVAL LOW COMPLEX 30 MIN: CPT

## 2025-05-12 PROCEDURE — 72170 X-RAY EXAM OF PELVIS: CPT | Mod: 26

## 2025-05-12 PROCEDURE — C1776: CPT

## 2025-05-12 DEVICE — HEAD FEM 36MM -5MM: Type: IMPLANTABLE DEVICE | Site: LEFT | Status: FUNCTIONAL

## 2025-05-12 DEVICE — FEM STEM INSIGNIA COLLARED HIGH SZ3 36X101MM: Type: IMPLANTABLE DEVICE | Site: LEFT | Status: FUNCTIONAL

## 2025-05-12 DEVICE — SHELL ACET TRIDENT II E 54MM: Type: IMPLANTABLE DEVICE | Site: LEFT | Status: FUNCTIONAL

## 2025-05-12 DEVICE — LINER ACET TRIDENT X3 0 DEG 36MM E: Type: IMPLANTABLE DEVICE | Site: LEFT | Status: FUNCTIONAL

## 2025-05-12 RX ORDER — POLYETHYLENE GLYCOL 3350 17 G/17G
17 POWDER, FOR SOLUTION ORAL AT BEDTIME
Refills: 0 | Status: DISCONTINUED | OUTPATIENT
Start: 2025-05-12 | End: 2025-05-26

## 2025-05-12 RX ORDER — NALOXONE HYDROCHLORIDE 0.4 MG/ML
1 INJECTION, SOLUTION INTRAMUSCULAR; INTRAVENOUS; SUBCUTANEOUS
Qty: 1 | Refills: 0
Start: 2025-05-12

## 2025-05-12 RX ORDER — CEFAZOLIN SODIUM IN 0.9 % NACL 3 G/100 ML
2000 INTRAVENOUS SOLUTION, PIGGYBACK (ML) INTRAVENOUS EVERY 8 HOURS
Refills: 0 | Status: COMPLETED | OUTPATIENT
Start: 2025-05-12 | End: 2025-05-13

## 2025-05-12 RX ORDER — OXYCODONE HYDROCHLORIDE 30 MG/1
1 TABLET ORAL
Qty: 28 | Refills: 0
Start: 2025-05-12 | End: 2025-05-18

## 2025-05-12 RX ORDER — SENNA 187 MG
2 TABLET ORAL AT BEDTIME
Refills: 0 | Status: DISCONTINUED | OUTPATIENT
Start: 2025-05-12 | End: 2025-05-26

## 2025-05-12 RX ORDER — ACETAMINOPHEN 500 MG/5ML
2 LIQUID (ML) ORAL
Qty: 84 | Refills: 0
Start: 2025-05-12 | End: 2025-05-25

## 2025-05-12 RX ORDER — KETOROLAC TROMETHAMINE 30 MG/ML
15 INJECTION, SOLUTION INTRAMUSCULAR; INTRAVENOUS EVERY 6 HOURS
Refills: 0 | Status: COMPLETED | OUTPATIENT
Start: 2025-05-12 | End: 2025-05-13

## 2025-05-12 RX ORDER — ICOSAPENT ETHYL 500 MG/1
2 CAPSULE ORAL
Refills: 0 | DISCHARGE

## 2025-05-12 RX ORDER — TRAMADOL HYDROCHLORIDE 50 MG/1
1 TABLET, FILM COATED ORAL
Qty: 28 | Refills: 0
Start: 2025-05-12 | End: 2025-05-18

## 2025-05-12 RX ORDER — OXYCODONE HYDROCHLORIDE 30 MG/1
5 TABLET ORAL EVERY 4 HOURS
Refills: 0 | Status: DISCONTINUED | OUTPATIENT
Start: 2025-05-12 | End: 2025-05-12

## 2025-05-12 RX ORDER — NAPROXEN SODIUM 275 MG
1 TABLET ORAL
Qty: 28 | Refills: 0
Start: 2025-05-12 | End: 2025-05-25

## 2025-05-12 RX ORDER — CEFAZOLIN SODIUM IN 0.9 % NACL 3 G/100 ML
2000 INTRAVENOUS SOLUTION, PIGGYBACK (ML) INTRAVENOUS ONCE
Refills: 0 | Status: COMPLETED | OUTPATIENT
Start: 2025-05-12 | End: 2025-05-12

## 2025-05-12 RX ORDER — TRAMADOL HYDROCHLORIDE 50 MG/1
50 TABLET, FILM COATED ORAL EVERY 6 HOURS
Refills: 0 | Status: DISCONTINUED | OUTPATIENT
Start: 2025-05-12 | End: 2025-05-12

## 2025-05-12 RX ORDER — ASPIRIN 325 MG
1 TABLET ORAL
Qty: 84 | Refills: 0
Start: 2025-05-12 | End: 2025-06-22

## 2025-05-12 RX ORDER — ACETAMINOPHEN 500 MG/5ML
1000 LIQUID (ML) ORAL ONCE
Refills: 0 | Status: COMPLETED | OUTPATIENT
Start: 2025-05-12 | End: 2025-05-12

## 2025-05-12 RX ORDER — TRAMADOL HYDROCHLORIDE 50 MG/1
50 TABLET, FILM COATED ORAL ONCE
Refills: 0 | Status: DISCONTINUED | OUTPATIENT
Start: 2025-05-12 | End: 2025-05-12

## 2025-05-12 RX ORDER — ONDANSETRON HCL/PF 4 MG/2 ML
4 VIAL (ML) INJECTION EVERY 6 HOURS
Refills: 0 | Status: DISCONTINUED | OUTPATIENT
Start: 2025-05-12 | End: 2025-05-26

## 2025-05-12 RX ORDER — MAGNESIUM HYDROXIDE 400 MG/5ML
30 SUSPENSION ORAL DAILY
Refills: 0 | Status: DISCONTINUED | OUTPATIENT
Start: 2025-05-12 | End: 2025-05-26

## 2025-05-12 RX ORDER — ATORVASTATIN CALCIUM 80 MG/1
1 TABLET, FILM COATED ORAL
Refills: 0 | DISCHARGE

## 2025-05-12 RX ORDER — OXYCODONE HYDROCHLORIDE 30 MG/1
10 TABLET ORAL EVERY 4 HOURS
Refills: 0 | Status: DISCONTINUED | OUTPATIENT
Start: 2025-05-12 | End: 2025-05-12

## 2025-05-12 RX ADMIN — OXYCODONE HYDROCHLORIDE 5 MILLIGRAM(S): 30 TABLET ORAL at 16:18

## 2025-05-12 RX ADMIN — Medication 500 MILLILITER(S): at 16:18

## 2025-05-12 RX ADMIN — Medication 40 MILLIGRAM(S): at 10:54

## 2025-05-12 RX ADMIN — Medication 100 MILLIGRAM(S): at 19:36

## 2025-05-12 RX ADMIN — Medication 400 MILLIGRAM(S): at 19:36

## 2025-05-12 RX ADMIN — SODIUM CHLORIDE 100 MILLILITER(S): 9 INJECTION, SOLUTION INTRAVENOUS at 16:17

## 2025-05-12 RX ADMIN — TRAMADOL HYDROCHLORIDE 50 MILLIGRAM(S): 50 TABLET, FILM COATED ORAL at 10:54

## 2025-05-12 RX ADMIN — Medication 1000 MILLIGRAM(S): at 20:05

## 2025-05-12 RX ADMIN — Medication 4 MILLIGRAM(S): at 16:18

## 2025-05-12 NOTE — ASU PATIENT PROFILE, ADULT - PRO MENTAL HEALTH SX RECENT
MISAEL and dry dressing applied to wound on left elbow, mom and pt given discharge instructions, mom and pt verbalized understanding  
none

## 2025-05-12 NOTE — ASU DISCHARGE PLAN (ADULT/PEDIATRIC) - ASU DC SPECIAL INSTRUCTIONSFT
WOUND CARE/SHOWERING:  You may shower right away (bandage is water-resistant), but do not submerge surgical area under water.  Your bandage will either say REMOVE IN 10 DAYS (which you do so at home) or DO NOT REMOVE (we will remove this in the office).  You may continue to shower after bandage removal.    PAIN CONTROL:  For at least the first week after surgery, you should take 1,000mg of Tylenol with breakfast, lunch, and dinner PLUS naproxen 500mg late morning and late afternoon.   The Tylenol and naproxen should be taken regardless of pain level.   For mild pain you may also take tramadol, and for moderate-severe pain use oxycodone.    FOLLOW UP:  You may have a pre-scheduled post-surgery appointment in your folder.   If not, please call the office and ask to speak with our coordinator (Sulma) to schedule your first post-op appointment, which will be around 2 weeks after surgery.

## 2025-05-12 NOTE — ASU PATIENT PROFILE, ADULT - FALL HARM RISK - PT AGE POPULATION HIDDEN
CTA head and neck images reviewed and no significant stenoses identified and nothing to explain his left frontal stroke/lesion. Echo results also reviewed and are non-contributory. Continue with plan as outlined in progress note from yesterday, including 30-day (not two week) cardiac event monitor, avoid ASA for now given hemorrhage within lesion, repeat MRI in 4-6 weeks w/wo contrast, and follow-up with both [facility] and [facility] after repeat MRI. No further in-patient neurologic workup indicated; will sign off; call with questions.     Grayson Yost MD  Neurology/Neurocritical Care  8/10/2024    Adult

## 2025-05-12 NOTE — ASU PATIENT PROFILE, ADULT - FALL HARM RISK - UNIVERSAL INTERVENTIONS
Bed in lowest position, wheels locked, appropriate side rails in place/Call bell, personal items and telephone in reach/Instruct patient to call for assistance before getting out of bed or chair/Non-slip footwear when patient is out of bed/[place] to call system/Physically safe environment - no spills, clutter or unnecessary equipment/Purposeful Proactive Rounding/Room/bathroom lighting operational, light cord in reach

## 2025-05-12 NOTE — OCCUPATIONAL THERAPY INITIAL EVALUATION ADULT - LEVEL OF INDEPENDENCE: SIT/STAND, REHAB EVAL
Cryosurgery for Skin Conditions, Care After  Refer to this sheet in the next few weeks. These instructions provide you with information on caring for yourself after your procedure. Your health care provider may also give you more specific instructions. Your treatment has been planned according to current medical practices, but problems sometimes occur. Call your health care provider if you have any problems or questions after your procedure.  WHAT TO EXPECT AFTER THE PROCEDURE  After your procedure, it is typical to have the following:  · The treated area will become red and swollen shortly after the procedure.  · Within 2-3 days, a blister will form over the treated area. The blister may contain a small amount of blood.  · In about 2 weeks, the blister will break on its own, leaving a scab. The treated area will then heal. After healing, there is usually little or no scarring.  HOME CARE INSTRUCTIONS   · Keep the treated area clean, dry, and covered with a bandage until healed. The area can be cleaned as usual with soap and water.  · You may take showers. If your bandage gets wet, change it right away.  · Do not pick at your blister or try to break it open. This can cause infection and scarring.  · Do not apply any medicine, cream, or lotion to the treated area unless directed to do so by your health care provider.  SEEK MEDICAL CARE IF:   · You have increased pain, swelling, redness, fluid drainage, or bleeding in the treated area.  · Your blister becomes large and painful.     This information is not intended to replace advice given to you by your health care provider. Make sure you discuss any questions you have with your health care provider.     Document Released: 07/07/2006 Document Revised: 08/20/2014 Document Reviewed: 07/18/2014  [employer] Interactive Patient Education ©2016 [employer]     T(C): 36.6 (10-31-22 @ 11:33), Max: 36.6 (10-31-22 @ 07:11)  T(F): 97.8 (10-31-22 @ 11:33), Max: 97.9 (10-31-22 @ 07:11)  HR: 61 (10-31-22 @ 11:33) (61 - 70)  BP: 145/82 (10-31-22 @ 11:33) (113/69 - 145/82)  RR: 18 (10-31-22 @ 11:33) (18 - 18)  SpO2: 100% (10-31-22 @ 11:33) (97% - 100%)    GENERAL: NAD, lying in bed comfortably  HEAD:  Atraumatic, Normocephalic  EYES: EOMI, PERRLA, conjunctiva and sclera clear  ENT: dry mucous membraines  NECK: Supple, No JVD  CHEST/LUNG: Clear to auscultation bilaterally; No rales, rhonchi, wheezing, or rubs. Unlabored respirations  HEART: Regular rate and rhythm; No murmurs, rubs, or gallops  ABDOMEN: Bowel sounds present; Soft, Nontender, Nondistended. No hepatomegally  EXTREMITIES:  2+ Peripheral Pulses, brisk capillary refill. No clubbing, cyanosis, or edema  NERVOUS SYSTEM: Non verbal, follows commands, 3/5 strength on all extremities,   MSK: FROM all 4 extremities, full and equal strength  SKIN: No rashes or lesions independent

## 2025-05-12 NOTE — OCCUPATIONAL THERAPY INITIAL EVALUATION ADULT - PERTINENT HX OF CURRENT PROBLEM, REHAB EVAL
74 year old female with PMHx of HLD. Patient denies fever, chills, SOB, chest pain. S/p left direct anterior total hip arthroplasty 5/12/25.

## 2025-05-12 NOTE — PHYSICAL THERAPY INITIAL EVALUATION ADULT - ADDITIONAL COMMENTS
Pt resides in a pvt home w/ spouse, 8 steps to enter, 6 additional steps inside (+HR). PTA pt was independent with all mobility & ADL's. Did not use an AD for ambulation, owns RW.

## 2025-05-12 NOTE — ASU DISCHARGE PLAN (ADULT/PEDIATRIC) - CARE PROVIDER_API CALL
Hernán Man  Joint Reconstruction  [address], [address]  [place] [address]  Phone: [phone]  Fax: [phone]  Follow Up Time: 2 weeks

## 2025-05-12 NOTE — ASU PATIENT PROFILE, ADULT - NSICDXPASTMEDICALHX_GEN_ALL_CORE_FT
Rx sent for 30/0 7/3/23. Pt needs appointment for further refills. Rx denied. PAST MEDICAL HISTORY:  HLD (hyperlipidemia)     Unilateral primary osteoarthritis, left hip

## 2025-05-12 NOTE — PRE-ANESTHESIA EVALUATION ADULT - ANESTHESIA, PREVIOUS REACTION, PROFILE
BRIEF OPERATIVE NOTE    Date of Procedure: 2/1/2017   Preoperative Diagnosis: INGROWN TOENAIL LEFT GREAT TOE  Postoperative Diagnosis: INGROWN TOENAIL LEFT GREAT TOE    Procedure(s):  SYME AMPUTATION LEFT GREAT TOE  (ANKLE BLOCK)  Surgeon(s) and Role:     * Woody Rossi MD - Primary            Surgical Staff:  Circ-1: Lizzy Ortiz RN; Sue Carcamo RN  Scrub Tech-1: Hesham Holland; Yenifer Ny  Surg Asst-1: Lauro Tinoco; Isela Montoya  Event Time In   Incision Start 1131   Incision Close 1151     Anesthesia: Other   Estimated Blood Loss: 0  Specimens:   ID Type Source Tests Collected by Time Destination   1 : Left Great Toe Nail Preservative Foot, left  Woody Rossi MD 2/1/2017 1133 Pathology      Findings: 0   Complications: 0  Implants: * No implants in log *
none

## 2025-05-12 NOTE — ASU DISCHARGE PLAN (ADULT/PEDIATRIC) - FINANCIAL ASSISTANCE
[facility] provides services at a reduced cost to those who are determined to be eligible through [facility]’s financial assistance program. Information regarding [facility]’s financial assistance program can be found by going to [email or web address] or by calling [phone].

## 2025-05-12 NOTE — ASU DISCHARGE PLAN (ADULT/PEDIATRIC) - NS MD DC FALL RISK RISK
For information on Fall & Injury Prevention, visit: [email or web address] OR  [email or web address] OR  https://www.cdc.gov/steadi/patient.html

## 2025-05-12 NOTE — OCCUPATIONAL THERAPY INITIAL EVALUATION ADULT - ADDITIONAL COMMENTS
Pt lives in  with  with 8 stairs to enter and 6 stairs in home. Pt owns walk in shower. Pt independent with ADLs and mobility with no AD. Pt owns RW.

## 2025-05-12 NOTE — PHYSICAL THERAPY INITIAL EVALUATION ADULT - PERTINENT HX OF CURRENT PROBLEM, REHAB EVAL
74 y.o. F [facility] HLD. Now s/p L total hip replacement, anterior approach, on 5/12/25.

## 2025-05-14 ENCOUNTER — NON-APPOINTMENT (OUTPATIENT)
Age: 75
End: 2025-05-14

## 2025-05-19 ENCOUNTER — NON-APPOINTMENT (OUTPATIENT)
Age: 75
End: 2025-05-19

## 2025-05-29 ENCOUNTER — APPOINTMENT (OUTPATIENT)
Dept: ORTHOPEDIC SURGERY | Facility: CLINIC | Age: 75
End: 2025-05-29
Payer: MEDICARE

## 2025-05-29 VITALS — WEIGHT: 170 LBS | BODY MASS INDEX: 29.75 KG/M2 | HEIGHT: 63.5 IN

## 2025-05-29 DIAGNOSIS — Z96.642 PRESENCE OF LEFT ARTIFICIAL HIP JOINT: ICD-10-CM

## 2025-05-29 PROCEDURE — 99024 POSTOP FOLLOW-UP VISIT: CPT

## 2025-05-29 PROCEDURE — 73502 X-RAY EXAM HIP UNI 2-3 VIEWS: CPT | Mod: 26,LT

## 2025-07-15 ENCOUNTER — APPOINTMENT (OUTPATIENT)
Dept: ORTHOPEDIC SURGERY | Facility: CLINIC | Age: 75
End: 2025-07-15
Payer: MEDICARE

## 2025-07-15 VITALS — BODY MASS INDEX: 29.75 KG/M2 | WEIGHT: 170 LBS | HEIGHT: 63.5 IN

## 2025-07-15 PROBLEM — M16.12 PRIMARY OSTEOARTHRITIS OF LEFT HIP: Status: RESOLVED | Noted: 2024-06-11 | Resolved: 2025-07-15

## 2025-07-15 PROBLEM — M25.552 LATERAL PAIN OF LEFT HIP: Status: RESOLVED | Noted: 2024-06-11 | Resolved: 2025-07-15

## 2025-07-15 PROCEDURE — 99024 POSTOP FOLLOW-UP VISIT: CPT

## 2025-07-23 ENCOUNTER — NON-APPOINTMENT (OUTPATIENT)
Age: 75
End: 2025-07-23

## 2025-08-01 ENCOUNTER — APPOINTMENT (OUTPATIENT)
Dept: ORTHOPEDIC SURGERY | Facility: CLINIC | Age: 75
End: 2025-08-01

## 2025-08-01 DIAGNOSIS — M67.442 GANGLION, LEFT HAND: ICD-10-CM

## 2025-08-01 DIAGNOSIS — M19.041 PRIMARY OSTEOARTHRITIS, RIGHT HAND: ICD-10-CM

## 2025-08-01 DIAGNOSIS — G56.01 CARPAL TUNNEL SYNDROME, RIGHT UPPER LIMB: ICD-10-CM

## 2025-08-01 DIAGNOSIS — M65.341 TRIGGER FINGER, RIGHT RING FINGER: ICD-10-CM

## 2025-08-01 DIAGNOSIS — M65.342 TRIGGER FINGER, LEFT RING FINGER: ICD-10-CM

## 2025-08-01 DIAGNOSIS — M19.042 PRIMARY OSTEOARTHRITIS, LEFT HAND: ICD-10-CM

## 2025-08-01 PROCEDURE — 20550 NJX 1 TENDON SHEATH/LIGAMENT: CPT | Mod: 50,79

## 2025-08-01 PROCEDURE — 99214 OFFICE O/P EST MOD 30 MIN: CPT | Mod: 25

## 2025-08-01 RX ORDER — LIDOCAINE HCL/PF 10 MG/ML
1 VIAL (ML) INJECTION
Refills: 0 | Status: COMPLETED | OUTPATIENT
Start: 2025-08-01

## 2025-08-01 RX ORDER — BETAMETHA AC,SOD PHOS/WATER/PF 6 MG/ML
6 (3-3) VIAL (ML) INJECTION
Qty: 2 | Refills: 0 | Status: COMPLETED | OUTPATIENT
Start: 2025-08-01

## 2025-08-01 RX ADMIN — LIDOCAINE HYDROCHLORIDE 0 %: 10 INJECTION, SOLUTION INFILTRATION; PERINEURAL at 00:00

## 2025-08-01 RX ADMIN — BETAMETHASONE ACETATE AND BETAMETHASONE SODIUM PHOSPHATE MG/ML: 3; 3 INJECTION, SUSPENSION INTRA-ARTICULAR; INTRALESIONAL; INTRAMUSCULAR; SOFT TISSUE at 00:00

## 2025-08-18 ENCOUNTER — APPOINTMENT (OUTPATIENT)
Dept: GASTROENTEROLOGY | Facility: CLINIC | Age: 75
End: 2025-08-18

## (undated) DEVICE — DRAPE 1/2 SHEET 40X57"

## (undated) DEVICE — HOOD FLYTE STRYKER SURGICOOL W PEELAWAY

## (undated) DEVICE — GRIPPER MEDENVISION

## (undated) DEVICE — DRAPE U (CLEAR) 47 X 51"

## (undated) DEVICE — DRAPE SUPINE ESYSUIT

## (undated) DEVICE — SUT QUILL MONODERM 0 1/2 CIRCLE TAPR 45CM 26MM

## (undated) DEVICE — STRYKER PULSE LAVAGE WITH HIGH FLOW TIP

## (undated) DEVICE — ELCTR BOVIE PENCIL SMOKE EVACUATION

## (undated) DEVICE — LIGHT PIPE

## (undated) DEVICE — SOL IRR BAG NS 0.9% 1000ML

## (undated) DEVICE — DRAPE 3/4 SHEET W REINFORCEMENT 56X77"

## (undated) DEVICE — GLV 8.5 PROTEXIS (WHITE)

## (undated) DEVICE — SAW BLADE STRYKER DUAL CUT 1.27X11 X90MM

## (undated) DEVICE — HOOD FLYTE STRYKER HELMET SHIELD

## (undated) DEVICE — SUT MONOCRYL 2-0 27" SH UNDYED

## (undated) DEVICE — SPECIMEN CONTAINER 100ML

## (undated) DEVICE — SOL IRR POUR NS 0.9% 500ML

## (undated) DEVICE — VENODYNE/SCD SLEEVE CALF LARGE

## (undated) DEVICE — SOL IRR POUR H2O 1000ML

## (undated) DEVICE — WARMING BLANKET UPPER ADULT

## (undated) DEVICE — SUT PDO 2 1/2 CIRCLE 40MM NDL 45CM

## (undated) DEVICE — DRSG DERMABOND 0.7ML

## (undated) DEVICE — GLV 8.5 PROTEXIS (BLUE)

## (undated) DEVICE — SUT QUILL MONODERM 2-0 3/8 CIRCLE 45CM

## (undated) DEVICE — TUBING TUR 2 PRONG

## (undated) DEVICE — SUT STRATAFIX SYMMETRIC PDS PLUS 1 18" CTX VIOLET

## (undated) DEVICE — DRSG AQUACEL 3.5 X 6"